# Patient Record
(demographics unavailable — no encounter records)

---

## 2025-02-10 NOTE — HISTORY OF PRESENT ILLNESS
[6] : 6 [Dull/Aching] : dull/aching [Localized] : localized [Retired] : Work status: retired [de-identified] :  02/10/2025 :TY CRENSHAW , a 81 year old female, presents today for Np right hand pain onset 2 weeks. Was initially seen by PCP who gave her IV steroid and has taken anti-inflammatory gout medication. No known injury. PmHx: Gout Meds: Allopurinol, aspirin, synthroid [employer] does volunteer work

## 2025-02-10 NOTE — ASSESSMENT
[FreeTextEntry1] : The patient was advised of the diagnosis. The natural history of the pathology was explained in full to the patient in layman's terms. All questions were answered. The risks and benefits of surgical and non-surgical treatment alternatives were explained in full to the patient.  Small joint injection was performed of the right index finger. The indication for this procedure was pain and inflammation. The site was prepped with alcohol and ethyl chloride sprayed topically. An injection of Lidocaine 1cc of 1%  was used Betamethasone (Celestone) 1cc of 6mg.  Patient was advised to call if redness, pain or fever occur and apply ice for 15 minutes out of every hour for the next 12-24 hours as tolerated. The risks benefits, and alternatives have been discussed, and verbal consent was obtained  CSI #1 performed to right index finger PIPj.  RTO 4 weeks

## 2025-02-10 NOTE — IMAGING
[de-identified] : Right hand  Skin intact NVID + Swelling index finger pipj swelling swelling over proximal phalanx index finger    Right hand x-rays 3 views taken today in office demonstrate 1st cmc djd